# Patient Record
Sex: MALE | Race: WHITE | NOT HISPANIC OR LATINO | Employment: FULL TIME | URBAN - METROPOLITAN AREA
[De-identification: names, ages, dates, MRNs, and addresses within clinical notes are randomized per-mention and may not be internally consistent; named-entity substitution may affect disease eponyms.]

---

## 2017-08-19 ENCOUNTER — HOSPITAL ENCOUNTER (EMERGENCY)
Facility: HOSPITAL | Age: 47
Discharge: HOME | End: 2017-08-19
Attending: EMERGENCY MEDICINE | Admitting: EMERGENCY MEDICINE

## 2017-08-19 VITALS
SYSTOLIC BLOOD PRESSURE: 140 MMHG | HEIGHT: 67 IN | HEART RATE: 84 BPM | TEMPERATURE: 98.6 F | OXYGEN SATURATION: 99 % | WEIGHT: 208 LBS | BODY MASS INDEX: 32.65 KG/M2 | DIASTOLIC BLOOD PRESSURE: 77 MMHG | RESPIRATION RATE: 16 BRPM

## 2017-08-19 DIAGNOSIS — H60.509 OTITIS EXTERNA; ACUTE: Primary | ICD-10-CM

## 2017-08-19 PROCEDURE — 99282 EMERGENCY DEPT VISIT SF MDM: CPT

## 2017-08-19 RX ORDER — TRAMADOL HYDROCHLORIDE 50 MG/1
50 TABLET ORAL EVERY 6 HOURS PRN
Qty: 15 TABLET | Refills: 0 | Status: SHIPPED | OUTPATIENT
Start: 2017-08-19 | End: 2017-09-03

## 2017-08-19 RX ORDER — NEOMYCIN SULFATE, POLYMYXIN B SULFATE AND HYDROCORTISONE 10; 3.5; 1 MG/ML; MG/ML; [USP'U]/ML
4 SUSPENSION/ DROPS AURICULAR (OTIC) EVERY 6 HOURS SCHEDULED
Qty: 10 ML | Refills: 0 | Status: SHIPPED | OUTPATIENT
Start: 2017-08-19

## 2019-10-30 ENCOUNTER — HOSPITAL ENCOUNTER (EMERGENCY)
Facility: HOSPITAL | Age: 49
Discharge: HOME | End: 2019-10-30
Attending: EMERGENCY MEDICINE | Admitting: EMERGENCY MEDICINE

## 2019-10-30 VITALS
WEIGHT: 222.66 LBS | DIASTOLIC BLOOD PRESSURE: 96 MMHG | BODY MASS INDEX: 31.88 KG/M2 | HEART RATE: 71 BPM | RESPIRATION RATE: 16 BRPM | HEIGHT: 70 IN | SYSTOLIC BLOOD PRESSURE: 150 MMHG | TEMPERATURE: 98.1 F | OXYGEN SATURATION: 99 %
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
## 2019-10-30 PROCEDURE — 99284 EMERGENCY DEPT VISIT MOD MDM: CPT | Performed by: EMERGENCY MEDICINE

## 2019-10-30 PROCEDURE — 99282 EMERGENCY DEPT VISIT SF MDM: CPT

## 2019-10-30 RX ORDER — DOXYCYCLINE HYCLATE 100 MG/1
200 CAPSULE ORAL ONCE
Status: COMPLETED | OUTPATIENT
Start: 2019-10-30 | End: 2019-10-30

## 2019-10-30 RX ADMIN — DOXYCYCLINE HYCLATE 200 MG: 100 CAPSULE ORAL at 16:59

## 2019-10-30 NOTE — ED PROVIDER NOTES
History  Chief Complaint   Patient presents with    Tick Bite     Pt was working @1200 and felt a bite to RUE, removed tick and then the area surrounding the bite became red and swollen  This is a 60-year-old male who presents with tick bite to the right upper arm  Tick was present for less than36 hours he has some mild erythema 2 cm area to the right biceps area no acute infection no drainage there is a small mouth part  Last tetanus was within 5 years      History provided by:  Patient  Medical Problem   Location:  Right upper arm  Quality:  Tick bite  Severity:  Mild  Timing:  Constant  Progression:  Unchanged  Chronicity:  New  Context:  Right arm tick bite less than 48 hours  Associated symptoms: no fever        Prior to Admission Medications   Prescriptions Last Dose Informant Patient Reported? Taking?   neomycin-polymyxin-hydrocortisone (CORTISPORIN) 0 35%-10,000 units/mL-1% otic suspension   No No   Sig: Administer 4 drops to the right ear every 6 (six) hours      Facility-Administered Medications: None       History reviewed  No pertinent past medical history  Past Surgical History:   Procedure Laterality Date    LEG SURGERY         History reviewed  No pertinent family history  I have reviewed and agree with the history as documented  Social History     Tobacco Use    Smoking status: Current Every Day Smoker     Packs/day: 1 00   Substance Use Topics    Alcohol use: Yes     Alcohol/week: 21 0 standard drinks     Types: 21 Cans of beer per week    Drug use: No        Review of Systems   Constitutional: Negative for fever  Skin: Positive for wound  All other systems reviewed and are negative  Physical Exam  Physical Exam   Constitutional: He appears well-developed and well-nourished  No distress  HENT:   Head: Normocephalic and atraumatic  Nose: Nose normal    Mouth/Throat: Oropharynx is clear and moist    Eyes: Pupils are equal, round, and reactive to light   EOM are normal  Right eye exhibits no discharge  Left eye exhibits no discharge  No scleral icterus  Neck: Neck supple  No JVD present  No tracheal deviation present  Cardiovascular: Normal rate, regular rhythm and intact distal pulses  Exam reveals no gallop and no friction rub  No murmur heard  Pulmonary/Chest: Effort normal and breath sounds normal  No stridor  No respiratory distress  He has no wheezes  Abdominal: Soft  Bowel sounds are normal  He exhibits no distension  There is no tenderness  Musculoskeletal: Normal range of motion  He exhibits no edema, tenderness or deformity  Skin: Skin is warm and dry  He is not diaphoretic  2 cm area of erythema to the right bicep area with tiny central black area no drainage no circular lesions   Psychiatric: He has a normal mood and affect  His behavior is normal  Thought content normal    Nursing note and vitals reviewed  Vital Signs  ED Triage Vitals [10/30/19 1637]   Temperature Pulse Respirations Blood Pressure SpO2   98 1 °F (36 7 °C) 71 16 150/96 99 %      Temp Source Heart Rate Source Patient Position - Orthostatic VS BP Location FiO2 (%)   Temporal Monitor Sitting Left arm --      Pain Score       No Pain           Vitals:    10/30/19 1637   BP: 150/96   Pulse: 71   Patient Position - Orthostatic VS: Sitting         Visual Acuity      ED Medications  Medications   doxycycline hyclate (VIBRAMYCIN) capsule 200 mg (has no administration in time range)       Diagnostic Studies  Results Reviewed     None                 No orders to display              Procedures  Procedures       ED Course                               MDM    Disposition  Final diagnoses:   Tick bite     Time reflects when diagnosis was documented in both MDM as applicable and the Disposition within this note     Time User Action Codes Description Comment    10/30/2019  4:55 PM Tito Dennis Dense  XXXA] Tick bite       ED Disposition     ED Disposition Condition Date/Time Comment Discharge Stable Wed Oct 30, 2019  4:55 PM Sue Cheyr discharge to home/self care  Follow-up Information     Follow up With Specialties Details Why Contact Info Additional Information    [address] [facility] Emergency Department Emergency Medicine  As needed [address] ED, [address], [facility]          Patient's Medications   Discharge Prescriptions    No medications on file     No discharge procedures on file      ED Provider  Electronically Signed by           Kieran Puga DO  10/30/19 [id number]

## 2025-07-18 ENCOUNTER — TELEPHONE (OUTPATIENT)
Age: 55
End: 2025-07-18

## 2025-07-18 NOTE — TELEPHONE ENCOUNTER
"Caller: Gladys (Spouse)    Doctor: Dr. Man    Reason for call: We are aware patient is not established yet, but I was advised by a SME to reach out to ask this question.    Jose wants a consult with donovan to possibly have surgery. Insurance is a Par Insurance \"[employer]\".   Spouse called insurance and have them our GROUP NPI number for [place].    [employer] stated the group takes it but Dr. Man does not, the insurance looked up his personal NPI. I tried to explain to the spouse to give them the group she explained she did. The insurance explained they have to look up individual NPI number of the doctor of who the patient is looking to have surgery with and it is coming up non par.    Have you heard this before with this insurance. Can we schedule a consult for patient to see Donovan?     SME and I wanted surgery schedulers opinion to see if you have run into this issue at all with this insurance.    Please let me know and I can call patient back, thanks.     Call back#: [phone]   "

## 2025-07-21 NOTE — TELEPHONE ENCOUNTER
Wife following up on previous message in regard to insurance being par with Dr. Man.  She can be reached at the number below.     Call back: [phone]

## 2025-07-28 NOTE — TELEPHONE ENCOUNTER
I called and spoke to Jose's spouse. She stated that he has [employer]. We are non participating with this insurance. She verbalized understanding and asked to cancel this appointment.